# Patient Record
Sex: MALE | Race: WHITE | NOT HISPANIC OR LATINO | URBAN - METROPOLITAN AREA
[De-identification: names, ages, dates, MRNs, and addresses within clinical notes are randomized per-mention and may not be internally consistent; named-entity substitution may affect disease eponyms.]

---

## 2017-06-22 ENCOUNTER — EMERGENCY (EMERGENCY)
Facility: HOSPITAL | Age: 32
LOS: 1 days | Discharge: HOME | End: 2017-06-22
Attending: EMERGENCY MEDICINE
Payer: MEDICAID

## 2017-06-22 VITALS
DIASTOLIC BLOOD PRESSURE: 73 MMHG | TEMPERATURE: 98 F | OXYGEN SATURATION: 96 % | HEART RATE: 88 BPM | RESPIRATION RATE: 20 BRPM | SYSTOLIC BLOOD PRESSURE: 116 MMHG

## 2017-06-22 VITALS
TEMPERATURE: 98 F | HEIGHT: 62 IN | HEART RATE: 92 BPM | SYSTOLIC BLOOD PRESSURE: 142 MMHG | DIASTOLIC BLOOD PRESSURE: 73 MMHG | RESPIRATION RATE: 20 BRPM | OXYGEN SATURATION: 98 % | WEIGHT: 315 LBS

## 2017-06-22 PROCEDURE — 99283 EMERGENCY DEPT VISIT LOW MDM: CPT

## 2017-06-22 RX ORDER — OXYCODONE HYDROCHLORIDE 5 MG/1
1 TABLET ORAL
Qty: 12 | Refills: 0 | OUTPATIENT
Start: 2017-06-22 | End: 2017-06-25

## 2017-06-22 NOTE — ED STATDOCS - MEDICAL DECISION MAKING DETAILS
Will prescribe for only 3 days oxycodone. If pt shows up again within 1-2 weeks, pt should not get any medication.

## 2017-06-22 NOTE — ED ADULT TRIAGE NOTE - CHIEF COMPLAINT QUOTE
Patient arrived to ED today with c/o right leg pain.  Patient states that he has neuropathy to his right leg and just stopped taking pain medication to his right leg due to his MD no longer filling the Rx.  Patient states that he has an appointment with pain management on the 19th of July.

## 2017-06-22 NOTE — ED STATDOCS - OBJECTIVE STATEMENT
32 y/o M w/ DM presents to the ED c/o right lower leg pain today. Pt states that he suffers from chronic neuropathy. He notes that his pain management appointment is next month but he no longer has medication left. Pt takes oxycodone 15 mg. Pt denies N/V, fever or any other complaints at this time.

## 2017-07-31 ENCOUNTER — EMERGENCY (EMERGENCY)
Facility: HOSPITAL | Age: 32
LOS: 1 days | Discharge: HOME | End: 2017-07-31
Attending: EMERGENCY MEDICINE
Payer: MEDICAID

## 2017-07-31 VITALS
DIASTOLIC BLOOD PRESSURE: 76 MMHG | TEMPERATURE: 99 F | RESPIRATION RATE: 20 BRPM | OXYGEN SATURATION: 98 % | WEIGHT: 248.02 LBS | HEART RATE: 90 BPM | SYSTOLIC BLOOD PRESSURE: 145 MMHG

## 2017-07-31 PROCEDURE — 99283 EMERGENCY DEPT VISIT LOW MDM: CPT

## 2017-07-31 RX ORDER — OXYCODONE HYDROCHLORIDE 5 MG/1
15 TABLET ORAL ONCE
Qty: 0 | Refills: 0 | Status: DISCONTINUED | OUTPATIENT
Start: 2017-07-31 | End: 2017-07-31

## 2017-07-31 RX ADMIN — OXYCODONE HYDROCHLORIDE 15 MILLIGRAM(S): 5 TABLET ORAL at 14:50

## 2017-07-31 NOTE — ED STATDOCS - PLAN OF CARE
Follow-up with pain mgmt as scheduled.  See your PMD today to discuss alternate pain management treatments

## 2017-07-31 NOTE — ED STATDOCS - CARE PLAN
Principal Discharge DX:	Chronic pain syndrome  Instructions for follow-up, activity and diet:	Follow-up with pain mgmt as scheduled.  See your PMD today to discuss alternate pain management treatments

## 2017-07-31 NOTE — ED STATDOCS - OBJECTIVE STATEMENT
32 y/o M w/ PMHx of DM and RSD presents to ED c/o chronic RLE pain x few days. Pt states that he has severe neuropathy of the RLE. PMD Dr. Boogie no longer Rx pain medications. Pt went to see a neurologist today who also does not Rx Oxycodone and prompted him to visit to the ED today. He reports he has found a new physician Dr. Jean however the next available appointment for pain management specialist is on 08/22/17. Pt takes Oxycodone 15mg QID (last dose yesterday), Lisinopril 30mg and Methocarbamol 350mg TID. Denies fever, chills or any other complaints at this time. 32 y/o M w/ PMHx of DM and RSD presents to ED c/o chronic RLE pain x few days. Pt states that he has severe neuropathy of the RLE. PMD Dr. Boogie no longer Rx pain medications. Pt went to see a neurologist today who also does not Rx Oxycodone and prompted him to visit to the ED today. He reports he has found a new pain management physician however the next available appointment for pain management specialist is on 08/22/17. Pt takes Oxycodone 15mg QID (last dose yesterday), Lisinopril 30mg and Methocarbamol 750mg TID: reports last dose of oxycodone was yesterday.  Denies fever, chills or any other complaints at this time.

## 2017-07-31 NOTE — ED STATDOCS - PROGRESS NOTE DETAILS
Called pain management of [place]. Pt moved up to 8/10/17 at 12 pm on [address], [place]. iSTOP reviewed: last rx for oxycodone 15mg #30 tablets on 7/25/17 (reference #[id number]).

## 2020-10-20 ENCOUNTER — EMERGENCY (EMERGENCY)
Facility: HOSPITAL | Age: 35
LOS: 1 days | Discharge: HOME | End: 2020-10-20
Attending: EMERGENCY MEDICINE
Payer: MEDICAID

## 2020-10-20 VITALS
RESPIRATION RATE: 18 BRPM | HEIGHT: 62 IN | HEART RATE: 89 BPM | DIASTOLIC BLOOD PRESSURE: 64 MMHG | TEMPERATURE: 98 F | WEIGHT: 259.93 LBS | OXYGEN SATURATION: 95 % | SYSTOLIC BLOOD PRESSURE: 103 MMHG

## 2020-10-20 PROBLEM — G90.50 COMPLEX REGIONAL PAIN SYNDROME I, UNSPECIFIED: Chronic | Status: ACTIVE | Noted: 2017-07-31

## 2020-10-20 PROCEDURE — 73610 X-RAY EXAM OF ANKLE: CPT

## 2020-10-20 PROCEDURE — 99283 EMERGENCY DEPT VISIT LOW MDM: CPT

## 2020-10-20 PROCEDURE — 73630 X-RAY EXAM OF FOOT: CPT

## 2020-10-20 PROCEDURE — 99284 EMERGENCY DEPT VISIT MOD MDM: CPT

## 2020-10-20 PROCEDURE — 73630 X-RAY EXAM OF FOOT: CPT | Mod: 26,LT

## 2020-10-20 PROCEDURE — 73610 X-RAY EXAM OF ANKLE: CPT | Mod: 26,LT

## 2020-10-20 RX ORDER — IBUPROFEN 200 MG
600 TABLET ORAL ONCE
Refills: 0 | Status: COMPLETED | OUTPATIENT
Start: 2020-10-20 | End: 2020-10-20

## 2020-10-20 RX ADMIN — Medication 600 MILLIGRAM(S): at 13:49

## 2020-10-20 NOTE — ED PROVIDER NOTE - OBJECTIVE STATEMENT
35 year old male with PMhx peripheral neuropathy presents to the ED for L ankle pain x 2 days after twisting foot when walking down the stairs. States since the he is unable to bear weight. Denies pain to knee, shin, lacs, abrasions, numbness or tingling.

## 2020-10-20 NOTE — ED ADULT NURSE NOTE - CHIEF COMPLAINT QUOTE
Patient A&Ox4 complaining of pain to left ankle & foot as well as difficulty bearing weight & ambulating secondary to fall x2 days ago.

## 2020-10-20 NOTE — ED PROVIDER NOTE - NSFOLLOWUPINSTRUCTIONS_ED_ALL_ED_FT
-Take motrin and Tylenol as needed for pain   -Follow up with orthopedist       Sprain    A sprain is a stretch or tear in one of the tough, fiber-like tissues (ligaments) in your body. This is caused by an injury to the area such as a twisting mechanism. Symptoms include pain, swelling, or bruising. Rest that area over the next several days and slowly resume activity when tolerated. Ice can help with swelling and pain.     SEEK IMMEDIATE MEDICAL CARE IF YOU HAVE ANY OF THE FOLLOWING SYMPTOMS: worsening pain, inability to move that body part, numbness or tingling.

## 2020-10-20 NOTE — ED PROVIDER NOTE - NSFOLLOWUPCLINICS_GEN_ALL_ED_FT
[facility]  Orthopedics  [address] [place] [id number]  Phone: [phone]  Fax:   Follow Up Time:

## 2020-10-20 NOTE — ED PROVIDER NOTE - PATIENT PORTAL LINK FT
You can access the FollowMyHealth Patient Portal offered by [facility] by registering at the following website: [email or web address]. By joining [employer] [facility]’s FollowMyHealth portal, you will also be able to view your health information using other applications (apps) compatible with our system.

## 2020-10-20 NOTE — ED PROVIDER NOTE - ATTENDING CONTRIBUTION TO CARE
Patient seen with PA.  s/p ankle injury.  Xrays demonstrate no acute pathology.  aircast for support.  will f/u. Non toxic.  Well appearing. Uneventful ED observation period.  Discussed results and outcome of testing with the patient.  Patient advised to please follow up with their primary care doctor within the next 24 hours and return to the Emergency Department for worsening symptoms or any other concerns.  Patient advised that their doctor may call  to follow up on the specific results of the tests performed today in the emergency department.

## 2020-10-21 NOTE — ED STATDOCS - RESPIRATORY, MLM
breath sounds clear and equal bilaterally. Cyclophosphamide Pregnancy And Lactation Text: This medication is Pregnancy Category D and it isn't considered safe during pregnancy. This medication is excreted in breast milk.

## 2022-11-15 NOTE — ED ADULT NURSE NOTE - NSFALLRSKPASTHIST_ED_ALL_ED
Patient's obesity is not improving.  Ozempic prescription was sent, we will see if it needs prior authorization.  Continue with diet and exercise.   no

## 2023-01-09 NOTE — ED ADULT TRIAGE NOTE - CHIEF COMPLAINT QUOTE
Patient A&Ox4 complaining of pain to left ankle & foot as well as difficulty bearing weight & ambulating secondary to fall x2 days ago.
Abdominal Pain, Adult      Pain in the abdomen (abdominal pain) can be caused by many things. Often, abdominal pain is not serious and it gets better with no treatment or by being treated at home. However, sometimes abdominal pain is serious.    Your health care provider will ask questions about your medical history and do a physical exam to try to determine the cause of your abdominal pain.      Follow these instructions at home:    Medicines     •Take over-the-counter and prescription medicines only as told by your health care provider.      • Do not take a laxative unless told by your health care provider.        General instructions      •Watch your condition for any changes.      •Drink enough fluid to keep your urine pale yellow.      •Keep all follow-up visits as told by your health care provider. This is important.        Contact a health care provider if:    •Your abdominal pain changes or gets worse.      •You are not hungry or you lose weight without trying.      •You are constipated or have diarrhea for more than 2–3 days.      •You have pain when you urinate or have a bowel movement.      •Your abdominal pain wakes you up at night.      •Your pain gets worse with meals, after eating, or with certain foods.      •You are vomiting and cannot keep anything down.      •You have a fever.      •You have blood in your urine.        Get help right away if:    •Your pain does not go away as soon as your health care provider told you to expect.      •You cannot stop vomiting.      •Your pain is only in areas of the abdomen, such as the right side or the left lower portion of the abdomen. Pain on the right side could be caused by appendicitis.      •You have bloody or black stools, or stools that look like tar.      •You have severe pain, cramping, or bloating in your abdomen.    •You have signs of dehydration, such as:  •Dark urine, very little urine, or no urine.      •Cracked lips.      •Dry mouth.      •Sunken eyes.      •Sleepiness.      •Weakness.        •You have trouble breathing or chest pain.        Summary    •Often, abdominal pain is not serious and it gets better with no treatment or by being treated at home. However, sometimes abdominal pain is serious.      •Watch your condition for any changes.      •Take over-the-counter and prescription medicines only as told by your health care provider.      •Contact a health care provider if your abdominal pain changes or gets worse.      •Get help right away if you have severe pain, cramping, or bloating in your abdomen.      This information is not intended to replace advice given to you by your health care provider. Make sure you discuss any questions you have with your health care provider.

## 2023-10-22 NOTE — ED STATDOCS - ATTESTATION, MLM
right hand weakness/numbness I have reviewed and confirmed nurses' notes for patient's medications, allergies, medical history, and surgical history.

## 2024-07-12 ENCOUNTER — EMERGENCY (EMERGENCY)
Facility: HOSPITAL | Age: 39
LOS: 1 days | Discharge: HOME | End: 2024-07-12
Attending: STUDENT IN AN ORGANIZED HEALTH CARE EDUCATION/TRAINING PROGRAM
Payer: COMMERCIAL

## 2024-07-12 VITALS
OXYGEN SATURATION: 94 % | HEART RATE: 112 BPM | TEMPERATURE: 98 F | SYSTOLIC BLOOD PRESSURE: 156 MMHG | WEIGHT: 292.99 LBS | RESPIRATION RATE: 18 BRPM | HEIGHT: 62 IN | DIASTOLIC BLOOD PRESSURE: 71 MMHG

## 2024-07-12 VITALS
DIASTOLIC BLOOD PRESSURE: 80 MMHG | OXYGEN SATURATION: 98 % | TEMPERATURE: 98 F | SYSTOLIC BLOOD PRESSURE: 124 MMHG | HEART RATE: 89 BPM | RESPIRATION RATE: 16 BRPM

## 2024-07-12 DIAGNOSIS — Z98.890 OTHER SPECIFIED POSTPROCEDURAL STATES: Chronic | ICD-10-CM

## 2024-07-12 PROCEDURE — 96372 THER/PROPH/DIAG INJ SC/IM: CPT

## 2024-07-12 PROCEDURE — 73564 X-RAY EXAM KNEE 4 OR MORE: CPT

## 2024-07-12 PROCEDURE — 99283 EMERGENCY DEPT VISIT LOW MDM: CPT

## 2024-07-12 PROCEDURE — 99284 EMERGENCY DEPT VISIT MOD MDM: CPT

## 2024-07-12 PROCEDURE — 73564 X-RAY EXAM KNEE 4 OR MORE: CPT | Mod: 26,LT

## 2024-07-12 RX ORDER — KETOROLAC TROMETHAMINE 30 MG/ML
30 INJECTION, SOLUTION INTRAMUSCULAR ONCE
Refills: 0 | Status: DISCONTINUED | OUTPATIENT
Start: 2024-07-12 | End: 2024-07-12

## 2024-07-12 RX ADMIN — KETOROLAC TROMETHAMINE 30 MILLIGRAM(S): 30 INJECTION, SOLUTION INTRAMUSCULAR at 09:53

## 2024-07-29 DIAGNOSIS — M25.562 PAIN IN LEFT KNEE: ICD-10-CM

## 2024-09-17 ENCOUNTER — APPOINTMENT (OUTPATIENT)
Dept: ORTHOPEDIC SURGERY | Facility: CLINIC | Age: 39
End: 2024-09-17
Payer: MEDICAID

## 2024-09-17 VITALS
WEIGHT: 255 LBS | SYSTOLIC BLOOD PRESSURE: 136 MMHG | HEART RATE: 86 BPM | BODY MASS INDEX: 45.18 KG/M2 | HEIGHT: 63 IN | DIASTOLIC BLOOD PRESSURE: 65 MMHG

## 2024-09-17 DIAGNOSIS — M17.12 UNILATERAL PRIMARY OSTEOARTHRITIS, LEFT KNEE: ICD-10-CM

## 2024-09-17 DIAGNOSIS — M25.562 PAIN IN LEFT KNEE: ICD-10-CM

## 2024-09-17 PROCEDURE — 73564 X-RAY EXAM KNEE 4 OR MORE: CPT | Mod: LT

## 2024-09-17 PROCEDURE — 99204 OFFICE O/P NEW MOD 45 MIN: CPT | Mod: 25

## 2024-09-17 RX ORDER — MELOXICAM 15 MG/1
15 TABLET ORAL
Qty: 30 | Refills: 0 | Status: ACTIVE | COMMUNITY
Start: 2024-09-17 | End: 1900-01-01

## 2024-09-17 NOTE — ADDENDUM
[FreeTextEntry1] : I, Savanna Dowd, acted solely as a scribe for Dr. Jarod Boyd on this date 09/17/2024. I personally performed the services described in the documentation, reviewed the documentation recorded by the scribe in my presence, and it accurately and completely records my words and actions.   I, Jarod Boyd MD, personally performed the services described in the documentation, reviewed the documentation recorded by the scribe in my presence and it accurately and completely records my words and actions.

## 2024-09-17 NOTE — REASON FOR VISIT
[Initial Visit] : an initial visit for [FreeTextEntry2] : 7/10 or 7/11 Pt reports was injured at the gas station was taking to [facility]

## 2024-09-17 NOTE — PHYSICAL EXAM
[Crutches] : ambulates with crutches [de-identified] : GENERAL APPEARANCE: Well nourished and hydrated, pleasant, alert, and oriented x 3. Appears their stated age. HEENT: Normocephalic, extraocular eye motion intact. Nasal septum midline. Oral cavity clear. External auditory canal clear. RESPIRATORY: Breath sounds clear and audible in all lobes. No wheezing, No accessory muscle use. CARDIOVASCULAR: No apparent abnormalities. No lower leg edema. No varicosities. Pedal pulses are palpable. NEUROLOGIC: Sensation is normal, no muscle weakness in the upper or lower extremities. DERMATOLOGIC: No apparent skin lesions, moist, warm, no rash. SPINE: Cervical spine appears normal and moves freely; thoracic spine appears normal and moves freely; lumbosacral spine appears normal and moves freely, normal, nontender. MUSCULOSKELETAL: Hands, wrists, and elbows are normal and move freely, shoulders are normal and move freely. PSYCHIATRIC: Oriented to person, place, and time, insight and judgement were intact and the affect was normal. 5/5 motor strength in bilateral lower extremities. Sensory: Intact in bilateral lower extremities. DTRs: Biceps, brachioradialis, triceps, patellar, ankle and plantar 2+ and symmetric bilaterally. Pulses: dorsalis pedis, posterior tibial, femoral, popliteal, and radial 2+ and symmetric bilaterally.  Constitutional: Alert and in no acute distress, but well-appearing.   [de-identified] : Left knee exam shows mild effusion, ROM is 0-90 degrees, no instability, pain with Norman, medial and patellofemoral joint line tenderness. 5/5 motor strength in bilateral lower extremities. Sensory: Intact in bilateral lower extremities. DTRs: Biceps, brachioradialis, triceps, patellar, ankle and plantar 2+ and symmetric bilaterally. Pulses: dorsalis pedis, posterior tibial, femoral, popliteal, and radial 2+ and symmetric bilaterally. [de-identified] :  4 views of the left knee obtained the office today show no acute fracture or dislocation.  There is lateral joint space narrowing consistent Kellgren-Donovan grade 1 2 changes.  No fractures identified

## 2024-09-17 NOTE — DISCUSSION/SUMMARY
[Medication Risks Reviewed] : Medication risks reviewed [Other: ____] : in [unfilled] [de-identified] : Patient is a 39-year-old male here today for evaluation of left knee pain is gone for past 2 months.  His pain is out of proportion to his exam and x-ray findings.  I do think that the majority the pain is experiencing is likely coming from his prolonged immobilization as he states he did not follow-up after his injury for 2 months.  However he does have severe pain and limitations range of motion.  For that reason I recommend an MRI of his left knee for further evaluation management.  We discussed low impact activity and exercise.  I recommend that he DC the brace.  I given prescription meloxicam 15 mg daily as needed for pain.  I will see him back after the MRI for repeat evaluation management.  All questions were asked and answered

## 2024-09-17 NOTE — HISTORY OF PRESENT ILLNESS
[Pain Location] : pain [] : left knee [Worsening] : worsening [___ mths] : [unfilled] month(s) ago [7] : a current pain level of 7/10 [___ Month(s) Ago] : [unfilled] month(s) ago [Fall] : a fall [Bending] : worsened by bending [Knee Flexion] : worsened with knee flexion [de-identified] : This is a 39 year old M pt presents today for left knee pain after an injury 2 months ago. On 7/10, pt reports was injured slipped at the gas station was taking to [facility]. Pt reports the next day his knees were swollen and couldn't bend. Pt comes in with a brace and crutches. He has neuropathy for the leg. He had to use the crutches after the fall. He has pain with weightbearing. Hospital said he might have blood clot under the kneecap. Pt has limited range of motion.  [de-identified] : Meloxicam

## 2024-12-21 ENCOUNTER — APPOINTMENT (OUTPATIENT)
Dept: MRI IMAGING | Facility: CLINIC | Age: 39
End: 2024-12-21
Payer: MEDICAID

## 2024-12-21 ENCOUNTER — OUTPATIENT (OUTPATIENT)
Dept: OUTPATIENT SERVICES | Facility: HOSPITAL | Age: 39
LOS: 1 days | End: 2024-12-21

## 2024-12-21 DIAGNOSIS — Z98.890 OTHER SPECIFIED POSTPROCEDURAL STATES: Chronic | ICD-10-CM

## 2024-12-21 DIAGNOSIS — M17.12 UNILATERAL PRIMARY OSTEOARTHRITIS, LEFT KNEE: ICD-10-CM

## 2024-12-21 PROCEDURE — 73721 MRI JNT OF LWR EXTRE W/O DYE: CPT | Mod: 26,LT

## 2025-01-08 ENCOUNTER — APPOINTMENT (OUTPATIENT)
Dept: ORTHOPEDIC SURGERY | Facility: CLINIC | Age: 40
End: 2025-01-08
Payer: MEDICAID

## 2025-01-08 VITALS
HEART RATE: 80 BPM | WEIGHT: 255 LBS | HEIGHT: 63 IN | BODY MASS INDEX: 45.18 KG/M2 | SYSTOLIC BLOOD PRESSURE: 128 MMHG | DIASTOLIC BLOOD PRESSURE: 75 MMHG

## 2025-01-08 DIAGNOSIS — M17.12 UNILATERAL PRIMARY OSTEOARTHRITIS, LEFT KNEE: ICD-10-CM

## 2025-01-08 DIAGNOSIS — M67.40 GANGLION, UNSPECIFIED SITE: ICD-10-CM

## 2025-01-08 PROCEDURE — 99213 OFFICE O/P EST LOW 20 MIN: CPT

## 2025-01-08 PROCEDURE — G2211 COMPLEX E/M VISIT ADD ON: CPT | Mod: NC
